# Patient Record
Sex: FEMALE | Race: WHITE | URBAN - METROPOLITAN AREA
[De-identification: names, ages, dates, MRNs, and addresses within clinical notes are randomized per-mention and may not be internally consistent; named-entity substitution may affect disease eponyms.]

---

## 2017-01-03 ENCOUNTER — OFFICE VISIT (OUTPATIENT)
Dept: NEUROLOGY | Age: 22
End: 2017-01-03

## 2017-01-03 VITALS
DIASTOLIC BLOOD PRESSURE: 76 MMHG | WEIGHT: 130 LBS | HEART RATE: 95 BPM | OXYGEN SATURATION: 98 % | HEIGHT: 63 IN | RESPIRATION RATE: 18 BRPM | SYSTOLIC BLOOD PRESSURE: 122 MMHG | BODY MASS INDEX: 23.04 KG/M2

## 2017-01-03 DIAGNOSIS — G43.009 MIGRAINE WITHOUT AURA, NOT INTRACTABLE, WITHOUT STATUS MIGRAINOSUS: Primary | ICD-10-CM

## 2017-01-03 NOTE — PROGRESS NOTES
Patient here for follow up on headaches. She has had 2 migraines since her last office visit in October 2016.

## 2017-01-03 NOTE — PATIENT INSTRUCTIONS
10 [facility]   Statement to Patients  April 1, 2014      In an effort to ensure the large volume of patient prescription refills is processed in the most efficient and expeditious manner, we are asking our patients to assist us by calling your Pharmacy for all prescription refills, this will include also your  Mail Order Pharmacy. The pharmacy will contact our office electronically to continue the refill process. Please do not wait until the last minute to call your pharmacy. We need at least 48 hours (2days) to fill prescriptions. We also encourage you to call your pharmacy before going to  your prescription to make sure it is ready. With regard to controlled substance prescription refill requests (narcotic refills) that need to be picked up at our office, we ask your cooperation by providing us with at least 72 hours (3days) notice that you will need a refill. We will not refill narcotic prescription refill requests after 4:00pm on any weekday, Monday through Thursday, or after 2:00pm on Fridays, or on the weekends. We encourage everyone to explore another way of getting your prescription refill request processed using Clipboard, our patient web portal through our electronic medical record system. Clipboard is an efficient and effective way to communicate your medication request directly to the office and  downloadable as an rakesh on your smart phone . Clipboard also features a review functionality that allows you to view your medication list as well as leave messages for your physician. Are you ready to get connected? If so please review the attatched instructions or speak to any of our staff to get you set up right away! Thank you so much for your cooperation. Should you have any questions please contact our Practice Administrator.     The Physicians and Staff,  [facility]         Thank you for choosing [facility] and [facility] for your     care. You may receive a survey about your visit. We appreciate you taking time     to complete this survey as we use your feedback to improve our services. We     realize we are not perfect, but we strive to provide excellent care.

## 2017-01-03 NOTE — PROGRESS NOTES
Neurology Progress Note    HISTORY PROVIDED BY: patient    Chief Complaint:   Chief Complaint   Patient presents with    Headache      Subjective:   Pt  is a 24 y.o. right handed female last seen in clinic on 10/7/16 in f/u for headaches with onset in 2013, often waking her, +N/V/P/P, c/w MHA. Reported daily HAs over the summer 2016, taking daily abortive meds and non-compliant with prevention medication because she did not want to take medication daily. Exam was non-focal, at last visit noted to have a large cafe-au-lait spot over left neck, 3/6 KAYLYNN. Discussed diagnosis of MHA, treatment with prevention meds, and limiting abortive meds to only 1 or 2 times per week or she should be on a HA prevention medication. She wanted wait to see if her HAs returned, if so, then start amitriptyline 10mg qhs. Given refill for Maxalt for 6 months. She returns for f/u. She called the clinic at reported starting the amitriptyline on 10/19/16. Her HAs are better, she believes they are 3 times a month, but did not really get up with them. She believes she is using Maxalt only 1, maybe 2 per week. She is satisfied with her current MHA control. They are much better than they were. Notes that the nurse reported a fast heart rate today. Pt is asymptomatic, does report h/o heart pounding in past causing chest pain. She denies taking Maxalt today or any other OTC meds today.        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    Pr abdomen surgery proc unlisted       surgery for kidney reflux      Social History     Social History    Marital status: SINGLE     Spouse name: N/A    Number of children: N/A    Years of education: N/A     Occupational History    Student      Social History Main Topics    Smoking status: Never Smoker    Smokeless tobacco: Never Used    Alcohol use No    Drug use: No    Sexual activity: Not on file     Other Topics Concern    Not on file     Social History Narrative    Lives with her mother     Family History   Problem Relation Age of Onset    High Cholesterol Mother     No Known Problems Father     High Cholesterol Brother      familial, kidney reflux surgery    Heart Attack Maternal Grandmother      age 33yo          Objective:   ROS:  Per HPI-  Otherwise 10 point ROS was negative    No Known Allergies    Meds:  Outpatient Medications Prior to Visit   Medication Sig Dispense Refill    rizatriptan (MAXALT) 10 mg tablet Take 1 Tab by mouth daily as needed for Migraine (at onset of migraine). 18 Tab 1    amitriptyline (ELAVIL) 10 mg tablet Take 2 Tabs by mouth nightly. 180 Tab 1    GILDESS FE 1/20, 28, 1 mg-20 mcg (21)/75 mg (7) tab Take one tablet by mouth daily       No facility-administered medications prior to visit. Imaging:  MRI Results (most recent):  No results found for this or any previous visit. CT Results (most recent):  No results found for this or any previous visit. Reviewed records in Dr. Tariff and [employer] tab today    Lab Review   Results for orders placed or performed in visit on 04/21/10   CULTURE, URINE   Result Value Ref Range    Specimen Description: URINE     Special Requests: NO SPECIAL REQUESTS     [place] Count <10,000 COLONIES/mL     Culture result: NO SIGNIFICANT GROWTH     Report Status 04/23/2010 FINAL         Exam:  Visit Vitals    /76    Pulse 95    Resp 18    Ht 5' 3\" (1.6 m)    Wt 59 kg (130 lb)    SpO2 98%    BMI 23.03 kg/m2     General:  Alert, cooperative, no distress. Head:  Normocephalic, without obvious abnormality, atraumatic, large cafe-au-lait spot over left neck,   Respiratory:  Heart:   Non labored breathing  Regular rhythm, tachycardic, 3/6 KAYLYNN       Extremities: Warm, no cyanosis or edema. Pulses: 2+ radial pulses. Neurologic:  MS: Alert and oriented x 4, speech intact. Language intact. Attention and fund of knowledge appropriate. Recent and remote memory intact. Cranial Nerves:  II: visual fields VFF    II: pupils PERRL   II: optic disc Sharp disc margin on right   III,VII: ptosis none   III,IV,VI: extraocular muscles  EOMI, no nystagmus or diplopia   V: facial light touch sensation     VII: facial muscle function   symmetric   VIII: hearing intact   IX: soft palate elevation     XI: trapezius strength     XI: sternocleidomastoid strength    XII: tongue       Motor: 5/5 throughout  Gait: Normal gait           Assessment/Plan   Pt  is a 24 y.o. right handed female with onset in 2013 of headaches, often waking her, +N/V/P/P, c/w MHA. Reported daily HAs over the summer 2016, taking daily abortive meds and non-compliant with prevention medication because she did not want to take medication daily, now resolved. MHAs improved on amitriptyline 10 mg nightly. Exam is non-focal, 3/6 KAYLYNN, tachycardia. Patient is happy with her current level of migraine headache control balanced with her reluctance to take medications. Will continue amitriptyline 10 mg nightly. Amitriptyline at high doses can cause a cardiac arrhythmia, not known for causing tachycardia. I recommend she discuss her tachycardia at her previously scheduled appointment with her PCP today. Patient is asked to keep a headache calendar and bring to her follow-up appointment. Follow-up in clinic in 4 months, instructed to call in the interim if needed. ICD-10-CM ICD-9-CM    1. Migraine without aura, not intractable, without status migrainosus G43.009 346.10        Signed:   Rohit Rodriguez MD  1/3/2017

## 2017-01-03 NOTE — MR AVS SNAPSHOT
Visit Information Date & Time Provider Department Dept. Phone Encounter #  
 1/3/2017 11:40 AM Verna Donning, MD [facility] at [address] [id number] Follow-up Instructions Return in about 4 months (around 5/3/2017). Routing History Upcoming Health Maintenance Date Due  
 HPV AGE 9Y-34Y (1 of 3 - Female 3 Dose Series) 2/9/2006 DTaP/Tdap/Td series (1 - Tdap) 2/9/2016 PAP AKA CERVICAL CYTOLOGY 2/9/2016 INFLUENZA AGE 9 TO ADULT 8/1/2016 Allergies as of 1/3/2017  Review Complete On: 1/3/2017 By: Zena Carson LPN No Known Allergies Current Immunizations  Never Reviewed No immunizations on file. Not reviewed this visit Vitals BP Pulse Resp Height(growth percentile) Weight(growth percentile) SpO2  
 122/76 95 18 5' 3\" (1.6 m) 130 lb (59 kg) 98% BMI Smoking Status 23.03 kg/m2 Never Smoker Vitals History BMI and BSA Data Body Mass Index Body Surface Area 23.03 kg/m 2 1.62 m 2 Preferred Pharmacy Pharmacy Name Phone [id number] [phone] Your Updated Medication List  
  
   
This list is accurate as of: 1/3/17 12:04 PM.  Always use your most recent med list.  
  
  
  
  
 amitriptyline 10 mg tablet Commonly known as:  ELAVIL Take 2 Tabs by mouth nightly. GILDESS FE 1/20 (28) 1 mg-20 mcg (21)/75 mg (7) Tab Generic drug:  norethindrone-ethinyl estradiol Take one tablet by mouth daily  
  
 rizatriptan 10 mg tablet Commonly known as:  Rachael Puls Take 1 Tab by mouth daily as needed for Migraine (at onset of migraine). Follow-up Instructions Return in about 4 months (around 5/3/2017). Patient Instructions PRESCRIPTION REFILL POLICY [facility] Statement to Patients April 1, 2014 In an effort to ensure the large volume of patient prescription refills is processed in the most efficient and expeditious manner, we are asking our patients to assist us by calling your Pharmacy for all prescription refills, this will include also your  Mail Order Pharmacy. The pharmacy will contact our office electronically to continue the refill process. Please do not wait until the last minute to call your pharmacy. We need at least 48 hours (2days) to fill prescriptions. We also encourage you to call your pharmacy before going to  your prescription to make sure it is ready. With regard to controlled substance prescription refill requests (narcotic refills) that need to be picked up at our office, we ask your cooperation by providing us with at least 72 hours (3days) notice that you will need a refill. We will not refill narcotic prescription refill requests after 4:00pm on any weekday, Monday through Thursday, or after 2:00pm on Fridays, or on the weekends. We encourage everyone to explore another way of getting your prescription refill request processed using Black Lotus, our patient web portal through our electronic medical record system. Black Lotus is an efficient and effective way to communicate your medication request directly to the office and  downloadable as an rakesh on your smart phone . Black Lotus also features a review functionality that allows you to view your medication list as well as leave messages for your physician. Are you ready to get connected? If so please review the attatched instructions or speak to any of our staff to get you set up right away! Thank you so much for your cooperation. Should you have any questions please contact our Practice Administrator. The Physicians and Staff,  [facility] Thank you for choosing [facility] for your  
 
care. You may receive a survey about your visit.   We appreciate you taking time  
 
to complete this survey as we use your feedback to improve our services. We  
 
realize we are not perfect, but we strive to provide excellent care. Introducing [facility]! Omari Veras introduces [employer] patient portal. Now you can access parts of your medical record, email your doctor's office, and request medication refills online. 1. In your internet browser, go to [email or web address] 2. Click on the First Time User? Click Here link in the Sign In box. You will see the New Member Sign Up page. 3. Enter your [employer] Access Code exactly as it appears below. You will not need to use this code after youve completed the sign-up process. If you do not sign up before the expiration date, you must request a new code. · [employer] Access Code: [email or web address] Expires: 1/5/2017  9:35 AM 
 
4. Enter the last four digits of your Social Security Number (xxxx) and Date of Birth (mm/dd/yyyy) as indicated and click Submit. You will be taken to the next sign-up page. 5. Create a [employer] ID. This will be your [employer] login ID and cannot be changed, so think of one that is secure and easy to remember. 6. Create a [employer] password. You can change your password at any time. 7. Enter your Password Reset Question and Answer. This can be used at a later time if you forget your password. 8. Enter your e-mail address. You will receive e-mail notification when new information is available in [address] 9. Click Sign Up. You can now view and download portions of your medical record. 10. Click the Download Summary menu link to download a portable copy of your medical information. If you have questions, please visit the Frequently Asked Questions section of the [employer] website. Remember, [employer] is NOT to be used for urgent needs. For medical emergencies, dial 911. Now available from your iPhone and Android! Please provide this summary of care documentation to your next provider. Your primary care clinician is listed as Julian Garzon. If you have any questions after today's visit, please call [phone].

## 2017-04-25 RX ORDER — RIZATRIPTAN BENZOATE 10 MG/1
TABLET ORAL
Qty: 18 TAB | Refills: 0 | Status: SHIPPED | OUTPATIENT
Start: 2017-04-25 | End: 2017-05-31 | Stop reason: SDUPTHER

## 2017-06-01 RX ORDER — RIZATRIPTAN BENZOATE 10 MG/1
TABLET ORAL
Qty: 18 TAB | Refills: 1 | Status: SHIPPED | OUTPATIENT
Start: 2017-06-01 | End: 2017-08-29 | Stop reason: SDUPTHER

## 2017-08-30 RX ORDER — RIZATRIPTAN BENZOATE 10 MG/1
TABLET ORAL
Qty: 18 TAB | Refills: 1 | Status: SHIPPED | OUTPATIENT
Start: 2017-08-30 | End: 2017-11-14 | Stop reason: SDUPTHER

## 2017-11-14 RX ORDER — RIZATRIPTAN BENZOATE 10 MG/1
TABLET ORAL
Qty: 18 TAB | Refills: 1 | Status: SHIPPED | OUTPATIENT
Start: 2017-11-14 | End: 2018-02-05 | Stop reason: SDUPTHER

## 2017-11-14 RX ORDER — AMITRIPTYLINE HYDROCHLORIDE 10 MG/1
TABLET, FILM COATED ORAL
Qty: 180 TAB | Refills: 1 | Status: SHIPPED | OUTPATIENT
Start: 2017-11-14

## 2017-11-28 ENCOUNTER — OFFICE VISIT (OUTPATIENT)
Dept: NEUROLOGY | Age: 22
End: 2017-11-28

## 2017-11-28 VITALS
HEART RATE: 90 BPM | OXYGEN SATURATION: 98 % | HEIGHT: 63 IN | BODY MASS INDEX: 23.04 KG/M2 | DIASTOLIC BLOOD PRESSURE: 62 MMHG | WEIGHT: 130 LBS | SYSTOLIC BLOOD PRESSURE: 124 MMHG | RESPIRATION RATE: 18 BRPM

## 2017-11-28 DIAGNOSIS — G43.009 MIGRAINE WITHOUT AURA, NOT INTRACTABLE, WITHOUT STATUS MIGRAINOSUS: Primary | ICD-10-CM

## 2017-11-28 RX ORDER — NORETHINDRONE ACETATE AND ETHINYL ESTRADIOL AND FERROUS FUMARATE 1MG-20(24)
KIT ORAL
COMMUNITY
Start: 2017-11-12

## 2017-11-28 NOTE — PATIENT INSTRUCTIONS
10 [facility]   Statement to Patients  April 1, 2014      In an effort to ensure the large volume of patient prescription refills is processed in the most efficient and expeditious manner, we are asking our patients to assist us by calling your Pharmacy for all prescription refills, this will include also your  Mail Order Pharmacy. The pharmacy will contact our office electronically to continue the refill process. Please do not wait until the last minute to call your pharmacy. We need at least 48 hours (2days) to fill prescriptions. We also encourage you to call your pharmacy before going to  your prescription to make sure it is ready. With regard to controlled substance prescription refill requests (narcotic refills) that need to be picked up at our office, we ask your cooperation by providing us with at least 72 hours (3days) notice that you will need a refill. We will not refill narcotic prescription refill requests after 4:00pm on any weekday, Monday through Thursday, or after 2:00pm on Fridays, or on the weekends. We encourage everyone to explore another way of getting your prescription refill request processed using InRiver, our patient web portal through our electronic medical record system. InRiver is an efficient and effective way to communicate your medication request directly to the office and  downloadable as an rakesh on your smart phone . InRiver also features a review functionality that allows you to view your medication list as well as leave messages for your physician. Are you ready to get connected? If so please review the attatched instructions or speak to any of our staff to get you set up right away! Thank you so much for your cooperation. Should you have any questions please contact our Practice Administrator.     The Physicians and Staff,  [facility]           Please bring all medication bottles, including vitamins, supplements and any over-the-counter medications, to your next office visit.

## 2017-11-28 NOTE — MR AVS SNAPSHOT
Visit Information Date & Time Provider Department Dept. Phone Encounter #  
 11/28/2017  3:40 PM Garrison Bernal MD [facility] at [address] [id number] Follow-up Instructions Return in about 6 months (around 5/28/2018). Upcoming Health Maintenance Date Due  
 HPV AGE 9Y-34Y (1 of 3 - Female 3 Dose Series) 2/9/2006 DTaP/Tdap/Td series (1 - Tdap) 2/9/2016 PAP AKA CERVICAL CYTOLOGY 2/9/2016 Influenza Age 5 to Adult 8/1/2017 Allergies as of 11/28/2017  Review Complete On: 11/28/2017 By: Sharlene Sy LPN Severity Noted Reaction Type Reactions Soy  11/28/2017    Unknown (comments) Current Immunizations  Never Reviewed No immunizations on file. Not reviewed this visit Vitals BP Pulse Resp Height(growth percentile) Weight(growth percentile) SpO2  
 124/62 90 18 5' 3\" (1.6 m) 130 lb (59 kg) 98% BMI Smoking Status 23.03 kg/m2 Never Smoker Vitals History BMI and BSA Data Body Mass Index Body Surface Area 23.03 kg/m 2 1.62 m 2 Preferred Pharmacy Pharmacy Name Phone [address] [facility] [phone] Your Updated Medication List  
  
   
This list is accurate as of: 11/28/17  4:19 PM.  Always use your most recent med list.  
  
  
  
  
 amitriptyline 10 mg tablet Commonly known as:  ELAVIL TAKE 2 TABLETS NIGHTLY  
  
 * GILDESS FE 1/20 (28) 1 mg-20 mcg (21)/75 mg (7) Tab Generic drug:  norethindrone-ethinyl estradiol Take one tablet by mouth daily * JUNEL FE 24 1 mg-20 mcg (24)/75 mg (4) Tab Generic drug:  norethindrone-e estradiol-iron  
  
 rizatriptan 10 mg tablet Commonly known as:  Juan Hipps TAKE 1 TABLET DAILY AS NEEDED FOR MIGRAINE (AT ONSET OF MIGRAINE) * Notice:   This list has 2 medication(s) that are the same as other medications prescribed for you. Read the directions carefully, and ask your doctor or other care provider to review them with you. Follow-up Instructions Return in about 6 months (around 5/28/2018). Patient Instructions PRESCRIPTION REFILL POLICY [facility] Statement to Patients April 1, 2014 In an effort to ensure the large volume of patient prescription refills is processed in the most efficient and expeditious manner, we are asking our patients to assist us by calling your Pharmacy for all prescription refills, this will include also your  Mail Order Pharmacy. The pharmacy will contact our office electronically to continue the refill process. Please do not wait until the last minute to call your pharmacy. We need at least 48 hours (2days) to fill prescriptions. We also encourage you to call your pharmacy before going to  your prescription to make sure it is ready. With regard to controlled substance prescription refill requests (narcotic refills) that need to be picked up at our office, we ask your cooperation by providing us with at least 72 hours (3days) notice that you will need a refill. We will not refill narcotic prescription refill requests after 4:00pm on any weekday, Monday through Thursday, or after 2:00pm on Fridays, or on the weekends. We encourage everyone to explore another way of getting your prescription refill request processed using [employer], our patient web portal through our electronic medical record system. [employer] is an efficient and effective way to communicate your medication request directly to the office and  downloadable as an rakesh on your smart phone . [employer] also features a review functionality that allows you to view your medication list as well as leave messages for your physician. Are you ready to get connected?  If so please review the attatched instructions or speak to any of our staff to get you set up right away! Thank you so much for your cooperation. Should you have any questions please contact our Practice Administrator. The Physicians and Staff,  [facility] Please bring all medication bottles, including vitamins, supplements and any over-the-counter medications, to your next office visit. Introducing [facility]! [facility] introduces DialMyApp patient portal. Now you can access parts of your medical record, email your doctor's office, and request medication refills online. 1. In your internet browser, go to [email or web address] 2. Click on the First Time User? Click Here link in the Sign In box. You will see the New Member Sign Up page. 3. Enter your DialMyApp Access Code exactly as it appears below. You will not need to use this code after youve completed the sign-up process. If you do not sign up before the expiration date, you must request a new code. · DialMyApp Access Code: [email or web address] Expires: 2/26/2018  3:32 PM 
 
4. Enter the last four digits of your Social Security Number (xxxx) and Date of Birth (mm/dd/yyyy) as indicated and click Submit. You will be taken to the next sign-up page. 5. Create a DialMyApp ID. This will be your DialMyApp login ID and cannot be changed, so think of one that is secure and easy to remember. 6. Create a DialMyApp password. You can change your password at any time. 7. Enter your Password Reset Question and Answer. This can be used at a later time if you forget your password. 8. Enter your e-mail address. You will receive e-mail notification when new information is available in [address] 9. Click Sign Up. You can now view and download portions of your medical record. 10. Click the Download Summary menu link to download a portable copy of your medical information.  
 
If you have questions, please visit the Frequently Asked Questions section of the [employer]. Remember, [employer] is NOT to be used for urgent needs. For medical emergencies, dial 911. Now available from your iPhone and Android! Please provide this summary of care documentation to your next provider. Your primary care clinician is listed as Jessica Mark. If you have any questions after today's visit, please call [phone].

## 2017-11-28 NOTE — PROGRESS NOTES
Patient here for follow up on headaches. Headaches are the same since last visit. They occur 1-2 times per week.

## 2017-11-28 NOTE — PROGRESS NOTES
Neurology Progress Note    HISTORY PROVIDED BY: patient    Chief Complaint:   Chief Complaint   Patient presents with    Headache     f/u      Subjective:   Pt  is a 25 y.o. right handed female last seen in [address] [place] [address] on 1/3/17 in f/u for onset in 2013 of headaches, often waking her, +N/V/P/P, c/w MHA. Reported daily HAs over the summer 2016, taking daily abortive meds and non-compliant with prevention medication because she did not want to take medication daily, now resolved. MHAs improved on amitriptyline 20 mg nightly. Exam was non-focal, 3/6 KAYLYNN, tachycardia. Patient was happy with her current level of migraine headache control balanced with her reluctance to take medications. Continued amitriptyline 20 mg nightly. Patient was asked to keep a headache calendar and bring to her follow-up appointment. Follow-up in clinic in 4 months. She returns for delayed f/u. 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 She feels like she is not having to use the rizatriptan as much now that she is on the amitriptyline. Has HA 1-2 times per week and is able to take abortive medication with improvement. Since last visit she was having palpatations and was found to have a BBB, she saw a cardiologist who was not concerned and she did not need f/u. She did cut out caffeine. No new complaints.  She has gotten engaged and is planning to  in Dec, 2018 in [facility].      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      Social History     Social History    Marital status: SINGLE     Spouse name: N/A    Number of children: N/A    Years of education: N/A     Occupational History    Student      Social History Main Topics    Smoking status: Never Smoker    Smokeless tobacco: Never Used    Alcohol use No    Drug use: No    Sexual activity: Not on file     Other Topics Concern    Not on file     Social History Narrative    Lives with her mother     Family History   Problem Relation Age of Onset    High Cholesterol Mother     No Known Problems Father     High Cholesterol Brother      familial, kidney reflux surgery    Heart Attack Maternal Grandmother      age 33yo          Objective:   ROS:  Per HPI-  Otherwise 10 point ROS was negative    Allergies   Allergen Reactions    Soy Unknown (comments)       Meds:  Outpatient Medications Prior to Visit   Medication Sig Dispense Refill    rizatriptan (MAXALT) 10 mg tablet TAKE 1 TABLET DAILY AS NEEDED FOR MIGRAINE (AT ONSET OF MIGRAINE) 18 Tab 1    amitriptyline (ELAVIL) 10 mg tablet TAKE 2 TABLETS NIGHTLY 180 Tab 1    GILDESS FE 1/20, 28, 1 mg-20 mcg (21)/75 mg (7) tab Take one tablet by mouth daily       No facility-administered medications prior to visit. Imaging:  MRI Results (most recent):  No results found for this or any previous visit. CT Results (most recent):  No results found for this or any previous visit. Reviewed records in [employer] and [employer] tab today    Lab Review   Results for orders placed or performed in visit on 04/21/10   CULTURE, URINE   Result Value Ref Range    Specimen Description: URINE     Special Requests: NO SPECIAL REQUESTS     [place] Count <10,000 COLONIES/mL     Culture result: NO SIGNIFICANT GROWTH     Report Status 04/23/2010 FINAL         Exam:  Visit Vitals    /62    Pulse 90    Resp 18    Ht 5' 3\" (1.6 m)    Wt 59 kg (130 lb)    SpO2 98%    BMI 23.03 kg/m2     General:  Alert, cooperative, no distress.    Head:  Normocephalic, without obvious abnormality, atraumatic, large cafe-au-lait spot over left neck,   Respiratory:  Heart:   Non labored breathing  Regular rhythm, tachycardic, 3/6 KAYLYNN       Extremities: Warm, no cyanosis or edema. Pulses: 2+ radial pulses. Neurologic:  MS: Alert and oriented x 4, speech intact. Language intact. Attention and fund of knowledge appropriate. Recent and remote memory intact. Cranial Nerves:  II: visual fields VFF    II: pupils PERRL   II: optic disc    III,VII: ptosis none   III,IV,VI: extraocular muscles  EOMI, no nystagmus or diplopia   V: facial light touch sensation     VII: facial muscle function   symmetric   VIII: hearing intact   IX: soft palate elevation  Palate elevated sym   XI: trapezius strength     XI: sternocleidomastoid strength    XII: tongue  Tongue midline     Motor: 5/5 throughout, no PD, no tremors  Coordination: FTN intact bilaterally  Gait: Normal gait           Assessment/Plan   Pt  is a 25 y.o. right handed female with onset in 2013 of headaches, often waking her, +N/V/P/P, c/w MHA. MHAs improved on amitriptyline 20 mg nightly, still 1-2 HAs per week, but responsive to abortive meds. Exam is non-focal, 3/6 KAYLYNN, tachycardia. Continue amitriptyline 20 mg nightly. Continue Maxalt for abortive therapy. F/u in clinic in 6 months, instructed to call in the interim if needed. ICD-10-CM ICD-9-CM    1. Migraine without aura, not intractable, without status migrainosus G43.009 346.10        Signed:   Matt Mendiola MD  11/28/2017

## 2018-04-03 RX ORDER — RIZATRIPTAN BENZOATE 10 MG/1
TABLET ORAL
Qty: 18 TAB | Refills: 0 | Status: SHIPPED | OUTPATIENT
Start: 2018-04-03 | End: 2018-05-17 | Stop reason: SDUPTHER

## 2018-05-29 RX ORDER — RIZATRIPTAN BENZOATE 10 MG/1
TABLET ORAL
Qty: 18 TAB | Refills: 0 | Status: SHIPPED | OUTPATIENT
Start: 2018-05-29 | End: 2018-07-09 | Stop reason: SDUPTHER

## 2018-07-09 ENCOUNTER — OFFICE VISIT (OUTPATIENT)
Dept: NEUROLOGY | Age: 23
End: 2018-07-09

## 2018-07-09 VITALS
HEIGHT: 63 IN | RESPIRATION RATE: 18 BRPM | SYSTOLIC BLOOD PRESSURE: 122 MMHG | HEART RATE: 90 BPM | OXYGEN SATURATION: 97 % | WEIGHT: 140 LBS | DIASTOLIC BLOOD PRESSURE: 70 MMHG | BODY MASS INDEX: 24.8 KG/M2

## 2018-07-09 DIAGNOSIS — G43.009 MIGRAINE WITHOUT AURA, NOT INTRACTABLE, WITHOUT STATUS MIGRAINOSUS: Primary | ICD-10-CM

## 2018-07-09 RX ORDER — PROPRANOLOL HYDROCHLORIDE 10 MG/1
10 TABLET ORAL 2 TIMES DAILY
Qty: 60 TAB | Refills: 5 | Status: SHIPPED | OUTPATIENT
Start: 2018-07-09

## 2018-07-09 RX ORDER — RIZATRIPTAN BENZOATE 10 MG/1
TABLET ORAL
Qty: 18 TAB | Refills: 5 | Status: SHIPPED | OUTPATIENT
Start: 2018-07-09 | End: 2018-07-23 | Stop reason: SDUPTHER

## 2018-07-09 RX ORDER — RIZATRIPTAN BENZOATE 10 MG/1
TABLET ORAL
Qty: 18 TAB | Refills: 5 | Status: SHIPPED | OUTPATIENT
Start: 2018-07-09 | End: 2018-07-09 | Stop reason: SDUPTHER

## 2018-07-09 NOTE — PROGRESS NOTES
Date:            2018    Name:  Selvin Kong  :  1995  MRN:  [id number]     PCP:  Kalpana Aleman MD    Chief Complaint   Patient presents with    Migraine         HISTORY OF PRESENT ILLNESS:  Leah Packer is a 21 y.o., female who presents today for follow up for headaches. She was last seen in 2017. She is taking amitriptyline 10 mg, her last rx said to increase but she did not. She thinks that it raises her HR and is reluctant to increase it further. She was seen by cardiologist at some point because EKG was suggestive of right bundle branch block, but reports that the cardiologist told her that the EKG was read wrong and she does not have a bundle branch block. She is not following with that provider. She denies palpitations. She thought that migraines were from eating soy, but she has cut that out and still has migraines. Her maxalt works well for abortive, but she finds herself taking it a few times a week. She gets 18 pills per prescription, usually refills that every month and a half    11.28.2017 recap  Pt  is a 25 y.o. right handed female last seen in [address] [place] [address] on 1/3/17 in f/u for onset in  of headaches, often waking her, +N/V/P/P, c/w MHA. Reported daily HAs over the summer , taking daily abortive meds and non-compliant with prevention medication because she did not want to take medication daily, now resolved. MHAs improved on amitriptyline 20 mg nightly. Exam was non-focal, 3/6 KAYLYNN, tachycardia. Patient was happy with her current level of migraine headache control balanced with her reluctance to take medications. Continued amitriptyline 20 mg nightly. Patient was asked to keep a headache calendar and bring to her follow-up appointment. Follow-up in clinic in 4 months.     She returns for delayed f/u. 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  She feels like she is not having to use the rizatriptan as much now that she is on the amitriptyline. Has HA 1-2 times per week and is able to take abortive medication with improvement. Since last visit she was having palpatations and was found to have a BBB, she saw a cardiologist who was not concerned and she did not need f/u. She did cut out caffeine. No new complaints. She has gotten engaged and is planning to  in Dec, 2018 in [facility].         Current Outpatient Prescriptions   Medication Sig    rizatriptan (MAXALT) 10 mg tablet TAKE 1 TABLET AS NEEDED AT ONSET OF MIGRAINE, MAY REPEAT FOR 1 TIME AFTER 2 HOURS IF NEEDED MAX 2 TABLETS PER 24 HOURS    JUNEL FE 24 1 mg-20 mcg (24)/75 mg (4) tab     amitriptyline (ELAVIL) 10 mg tablet TAKE 2 TABLETS NIGHTLY    GILDESS FE 1/20, 28, 1 mg-20 mcg (21)/75 mg (7) tab Take one tablet by mouth daily     No current facility-administered medications for this visit.       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     Social History     Social History    Marital status: SINGLE     Spouse name: N/A    Number of children: N/A    Years of education: N/A     Occupational History    Student      Social History Main Topics    Smoking status: Never Smoker    Smokeless tobacco: Never Used    Alcohol use No    Drug use: No    Sexual activity: Not on file     Other Topics Concern    Not on file     Social History Narrative    Lives with her mother     Family History   Problem Relation Age of Onset    High Cholesterol Mother     No Known Problems Father     High Cholesterol Brother      familial, kidney reflux surgery    Heart Attack Maternal Grandmother      age 33yo         PHYSICAL EXAMINATION:    Visit Vitals    /70    Pulse 90    Resp 18    Ht 5' 3\" (1.6 m)    Wt 63.5 kg (140 lb)    SpO2 97%    BMI 24.8 kg/m2     General:  Well defined, nourished, and groomed individual in no acute distress. Neck: Supple, nontender, no bruits. Heart: Regular rate and rhythm, no murmurs, rub, or gallop. Normal S1S2. Lungs:  Clear to auscultation bilaterally with equal chest expansion, no cough, no wheeze  Musculoskeletal:  Extremities revealed no edema and had full range of motion of joints. Psych:  Good mood and bright affect    NEUROLOGICAL EXAMINATION:     Mental Status:   Alert and oriented to person, place, and time with recent and remote memory intact. Attention span and concentration are normal. Speech is fluent with a full fund of knowledge. Cranial Nerves:    II, III, IV, VI:  Visual acuity grossly intact. Pupils are equal, round, and reactive to light. Extra-ocular movements are full and fluid. No ptosis or nystagmus. V-XII: Hearing is grossly intact. Facial features are symmetric, with normal sensation and strength. The palate rises symmetrically and the tongue protrudes midline. Sternocleidomastoids 5/5. Motor Examination: Normal tone, bulk, and strength, 5/5 muscle strength throughout. Coordination:  Finger to nose testing was normal.   No resting or intention tremor  Gait and Station:  Steady while walking and with tandem walking. Normal arm swing. No pronator drift. No muscle wasting or fasciculations noted. ASSESSMENT AND PLAN    ICD-10-CM ICD-9-CM    1. Migraine without aura, not intractable, without status migrainosus G43.009 346.10 propranolol (INDERAL) 10 mg tablet      rizatriptan (MAXALT) 10 mg tablet      DISCONTINUED: rizatriptan (MAXALT) 10 mg tablet     77-year-old female seen in follow-up for migraines. She is on amitriptyline for preventative, would prefer to try different one.   She has about 12 migraines per month, Maxalt works well for abortive. She does not want to try Topamax because it might make her birth control ineffective. 1.  Propranolol 10 mg twice daily for migraine preventative, discussed side effects  2. Continue Maxalt 10 mg as needed for migraine abortive  3. Provided with migraine education, discussed lifestyle modifications that she can try for migraine prevention. Encouraged her that magnesium supplement, but discussed that it may take 3 months to be effective    Follow-up in 6 months, call sooner with concerns      Agata Pereira NP    This note was created using voice recognition software. Despite editing, there may be syntax errors.

## 2018-07-09 NOTE — PATIENT INSTRUCTIONS
Propranolol 10 mg tabs: Take 1 tab at bedtime initially to see how you tolerate it, then take 1 tab twice daily    Call the office if you get dizzy or drowsy on this medication, or if your heart rate is lower than [address]   Statement to Patients  April 1, 2014      In an effort to ensure the large volume of patient prescription refills is processed in the most efficient and expeditious manner, we are asking our patients to assist us by calling your Pharmacy for all prescription refills, this will include also your  Mail Order Pharmacy. The pharmacy will contact our office electronically to continue the refill process. Please do not wait until the last minute to call your pharmacy. We need at least 48 hours (2days) to fill prescriptions. We also encourage you to call your pharmacy before going to  your prescription to make sure it is ready. With regard to controlled substance prescription refill requests (narcotic refills) that need to be picked up at our office, we ask your cooperation by providing us with at least 72 hours (3days) notice that you will need a refill. We will not refill narcotic prescription refill requests after 4:00pm on any weekday, Monday through Thursday, or after 2:00pm on Fridays, or on the weekends. We encourage everyone to explore another way of getting your prescription refill request processed using [employer], our patient web portal through our electronic medical record system. [employer] is an efficient and effective way to communicate your medication request directly to the office and  downloadable as an rakesh on your smart phone . [employer] also features a review functionality that allows you to view your medication list as well as leave messages for your physician. Are you ready to get connected? If so please review the attatched instructions or speak to any of our staff to get you set up right away!     Thank you so much for your cooperation. Should you have any questions please contact our Practice Administrator. The Physicians and Staff,  [facility]                A Healthy Lifestyle: Care Instructions  Your Care Instructions    A healthy lifestyle can help you feel good, stay at a healthy weight, and have plenty of energy for both work and play. A healthy lifestyle is something you can share with your whole family. A healthy lifestyle also can lower your risk for serious health problems, such as high blood pressure, heart disease, and diabetes. You can follow a few steps listed below to improve your health and the health of your family. Follow-up care is a key part of your treatment and safety. Be sure to make and go to all appointments, and call your doctor if you are having problems. It's also a good idea to know your test results and keep a list of the medicines you take. How can you care for yourself at home? · Do not eat too much sugar, fat, or fast foods. You can still have dessert and treats now and then. The goal is moderation. · Start small to improve your eating habits. Pay attention to portion sizes, drink less juice and soda pop, and eat more fruits and vegetables. ¨ Eat a healthy amount of food. A 3-ounce serving of meat, for example, is about the size of a deck of cards. Fill the rest of your plate with vegetables and whole grains. ¨ Limit the amount of soda and sports drinks you have every day. Drink more water when you are thirsty. ¨ Eat at least 5 servings of fruits and vegetables every day. It may seem like a lot, but it is not hard to reach this goal. A serving or helping is 1 piece of fruit, 1 cup of vegetables, or 2 cups of leafy, raw vegetables. Have an apple or some carrot sticks as an afternoon snack instead of a candy bar. Try to have fruits and/or vegetables at every meal.  · Make exercise part of your daily routine.  You may want to start with simple activities, such as walking, bicycling, or slow swimming. Try to be active 30 to 60 minutes every day. You do not need to do all 30 to 60 minutes all at once. For example, you can exercise 3 times a day for 10 or 20 minutes. Moderate exercise is safe for most people, but it is always a good idea to talk to your doctor before starting an exercise program.  · Keep moving. Kunaljulio Edgar the lawn, work in the garden, or [employer]. Take the stairs instead of the elevator at work. · If you smoke, quit. People who smoke have an increased risk for heart attack, stroke, cancer, and other lung illnesses. Quitting is hard, but there are ways to boost your chance of quitting tobacco for good. ¨ Use nicotine gum, patches, or lozenges. ¨ Ask your doctor about stop-smoking programs and medicines. ¨ Keep trying. In addition to reducing your risk of diseases in the future, you will notice some benefits soon after you stop using tobacco. If you have shortness of breath or asthma symptoms, they will likely get better within a few weeks after you quit. · Limit how much alcohol you drink. Moderate amounts of alcohol (up to 2 drinks a day for men, 1 drink a day for women) are okay. But drinking too much can lead to liver problems, high blood pressure, and other health problems. Family health  If you have a family, there are many things you can do together to improve your health. · Eat meals together as a family as often as possible. · Eat healthy foods. This includes fruits, vegetables, lean meats and dairy, and whole grains. · Include your family in your fitness plan. Most people think of activities such as jogging or tennis as the way to fitness, but there are many ways you and your family can be more active. Anything that makes you breathe hard and gets your heart pumping is exercise. Here are some tips:  ¨ Walk to do errands or to take your child to school or the bus. ¨ Go for a family bike ride after dinner instead of watching TV.   Where can you learn more? Go to http://dileep-raymond.info/. Enter N876 in the search box to learn more about \"A Healthy Lifestyle: Care Instructions. \"  Current as of: May 12, 2017  Content Version: 11.4  © 7359-4950 Healthwise, [employer]. Care instructions adapted under license by [employer] (which disclaims liability or warranty for this information). If you have questions about a medical condition or this instruction, always ask your healthcare professional. [address] any warranty or liability for your use of this information.

## 2018-07-09 NOTE — MR AVS SNAPSHOT
[facility] [address] 
[phone] Patient: Tameka Monzon MRN: [id number] Visit Information Date & Time Provider Department Dept. Phone Encounter #  
 7/9/2018 11:30 AM Constance Casey NP [facility] at [address] [id number] Upcoming Health Maintenance Date Due  
 HPV Age 9Y-34Y (1 of 1 - Female 3 Dose Series) 2/9/2006 DTaP/Tdap/Td series (1 - Tdap) 2/9/2016 PAP AKA CERVICAL CYTOLOGY 2/9/2016 Influenza Age 5 to Adult 8/1/2018 Allergies as of 7/9/2018  Review Complete On: 7/9/2018 By: Onel Lucas LPN Severity Noted Reaction Type Reactions Soy  11/28/2017    Unknown (comments) Current Immunizations  Never Reviewed No immunizations on file. Not reviewed this visit You Were Diagnosed With   
  
 Codes Comments Migraine without aura, not intractable, without status migrainosus    -  Primary ICD-10-CM: G43.009 ICD-9-CM: 346.10 Vitals BP Pulse Resp Height(growth percentile) Weight(growth percentile) SpO2  
 122/70 90 18 5' 3\" (1.6 m) 140 lb (63.5 kg) 97% BMI Smoking Status 24.8 kg/m2 Never Smoker Vitals History BMI and BSA Data Body Mass Index Body Surface Area  
 24.8 kg/m 2 1.68 m 2 Preferred Pharmacy Pharmacy Name Phone Josue Hudson, [facility] [phone] Your Updated Medication List  
  
   
This list is accurate as of 7/9/18 12:04 PM.  Always use your most recent med list.  
  
  
  
  
 amitriptyline 10 mg tablet Commonly known as:  ELAVIL TAKE 2 TABLETS NIGHTLY  
  
 * GILDESS FE 1/20 (28) 1 mg-20 mcg (21)/75 mg (7) Tab Generic drug:  norethindrone-ethinyl estradiol Take one tablet by mouth daily * JUNEL FE 24 1 mg-20 mcg (24)/75 mg (4) Tab Generic drug:  norethindrone-e estradiol-iron propranolol 10 mg tablet Commonly known as:  INDERAL Take 1 Tab by mouth two (2) times a day. rizatriptan 10 mg tablet Commonly known as:  [place] [employer] TAKE 1 TABLET AS NEEDED AT ONSET OF MIGRAINE, MAY REPEAT FOR 1 TIME AFTER 2 HOURS IF NEEDED MAX 2 TABLETS PER 24 HOURS * Notice: This list has 2 medication(s) that are the same as other medications prescribed for you. Read the directions carefully, and ask your doctor or other care provider to review them with you. Prescriptions Printed Refills  
 propranolol (INDERAL) 10 mg tablet 5 Sig: Take 1 Tab by mouth two (2) times a day. Class: Print Route: Oral  
 rizatriptan (MAXALT) 10 mg tablet 5 Sig: TAKE 1 TABLET AS NEEDED AT ONSET OF MIGRAINE, MAY REPEAT FOR 1 TIME AFTER 2 HOURS IF NEEDED MAX 2 TABLETS PER 24 HOURS Class: Print Patient Instructions Propranolol 10 mg tabs: Take 1 tab at bedtime initially to see how you tolerate it, then take 1 tab twice daily Call the office if you get dizzy or drowsy on this medication, or if your heart rate is lower than 60 PRESCRIPTION REFILL POLICY [facility] Statement to Patients April 1, 2014 In an effort to ensure the large volume of patient prescription refills is processed in the most efficient and expeditious manner, we are asking our patients to assist us by calling your Pharmacy for all prescription refills, this will include also your  Mail Order Pharmacy. The pharmacy will contact our office electronically to continue the refill process. Please do not wait until the last minute to call your pharmacy. We need at least 48 hours (2days) to fill prescriptions. We also encourage you to call your pharmacy before going to  your prescription to make sure it is ready.   
 
With regard to controlled substance prescription refill requests (narcotic refills) that need to be picked up at our office, we ask your cooperation by providing us with at least 72 hours (3days) notice that you will need a refill. We will not refill narcotic prescription refill requests after 4:00pm on any weekday, Monday through Thursday, or after 2:00pm on Fridays, or on the weekends. We encourage everyone to explore another way of getting your prescription refill request processed using e-SENS, our patient web portal through our electronic medical record system. e-SENS is an efficient and effective way to communicate your medication request directly to the office and  downloadable as an rakesh on your smart phone . e-SENS also features a review functionality that allows you to view your medication list as well as leave messages for your physician. Are you ready to get connected? If so please review the attatched instructions or speak to any of our staff to get you set up right away! Thank you so much for your cooperation. Should you have any questions please contact our Practice Administrator. The Physicians and Staff,  [facility] A Healthy Lifestyle: Care Instructions Your Care Instructions A healthy lifestyle can help you feel good, stay at a healthy weight, and have plenty of energy for both work and play. A healthy lifestyle is something you can share with your whole family. A healthy lifestyle also can lower your risk for serious health problems, such as high blood pressure, heart disease, and diabetes. You can follow a few steps listed below to improve your health and the health of your family. Follow-up care is a key part of your treatment and safety. Be sure to make and go to all appointments, and call your doctor if you are having problems. It's also a good idea to know your test results and keep a list of the medicines you take. How can you care for yourself at home? · Do not eat too much sugar, fat, or fast foods. You can still have dessert and treats now and then. The goal is moderation. · Start small to improve your eating habits. Pay attention to portion sizes, drink less juice and soda pop, and eat more fruits and vegetables. ¨ Eat a healthy amount of food. A 3-ounce serving of meat, for example, is about the size of a deck of cards. Fill the rest of your plate with vegetables and whole grains. ¨ Limit the amount of soda and sports drinks you have every day. Drink more water when you are thirsty. ¨ Eat at least 5 servings of fruits and vegetables every day. It may seem like a lot, but it is not hard to reach this goal. A serving or helping is 1 piece of fruit, 1 cup of vegetables, or 2 cups of leafy, raw vegetables. Have an apple or some carrot sticks as an afternoon snack instead of a candy bar. Try to have fruits and/or vegetables at every meal. 
· Make exercise part of your daily routine. You may want to start with simple activities, such as walking, bicycling, or slow swimming. Try to be active 30 to 60 minutes every day. You do not need to do all 30 to 60 minutes all at once. For example, you can exercise 3 times a day for 10 or 20 minutes. Moderate exercise is safe for most people, but it is always a good idea to talk to your doctor before starting an exercise program. 
· Keep moving. Maymelanie Dais the lawn, work in the garden, or [employer]. Take the stairs instead of the elevator at work. · If you smoke, quit. People who smoke have an increased risk for heart attack, stroke, cancer, and other lung illnesses. Quitting is hard, but there are ways to boost your chance of quitting tobacco for good. ¨ Use nicotine gum, patches, or lozenges. ¨ Ask your doctor about stop-smoking programs and medicines. ¨ Keep trying. In addition to reducing your risk of diseases in the future, you will notice some benefits soon after you stop using tobacco. If you have shortness of breath or asthma symptoms, they will likely get better within a few weeks after you quit. · Limit how much alcohol you drink. Moderate amounts of alcohol (up to 2 drinks a day for men, 1 drink a day for women) are okay. But drinking too much can lead to liver problems, high blood pressure, and other health problems. Family health If you have a family, there are many things you can do together to improve your health. · Eat meals together as a family as often as possible. · Eat healthy foods. This includes fruits, vegetables, lean meats and dairy, and whole grains. · Include your family in your fitness plan. Most people think of activities such as jogging or tennis as the way to fitness, but there are many ways you and your family can be more active. Anything that makes you breathe hard and gets your heart pumping is exercise. Here are some tips: 
¨ Walk to do errands or to take your child to school or the bus. ¨ Go for a family bike ride after dinner instead of watching TV. Where can you learn more? Go to http://dileep-raymond.info/. Enter V656 in the search box to learn more about \"A Healthy Lifestyle: Care Instructions. \" Current as of: May 12, 2017 Content Version: 11.4 © 4015-7192 [employer] Care instructions adapted under license by [employer] (which disclaims liability or warranty for this information). If you have questions about a medical condition or this instruction, always ask your healthcare professional. [place] [address] any warranty or liability for your use of this information. Introducing [facility]! [employer] introduces [employer] patient portal. Now you can access parts of your medical record, email your doctor's office, and request medication refills online. 1. In your internet browser, go to [email or web address] 2. Click on the First Time User? Click Here link in the Sign In box. You will see the New Member Sign Up page. 3. Enter your [employer] Access Code exactly as it appears below. You will not need to use this code after youve completed the sign-up process. If you do not sign up before the expiration date, you must request a new code. · [employer] Access Code: [email or web address] Expires: 10/7/2018 11:28 AM 
 
4. Enter the last four digits of your Social Security Number (xxxx) and Date of Birth (mm/dd/yyyy) as indicated and click Submit. You will be taken to the next sign-up page. 5. Create a [employer] ID. This will be your [employer] login ID and cannot be changed, so think of one that is secure and easy to remember. 6. Create a [employer] password. You can change your password at any time. 7. Enter your Password Reset Question and Answer. This can be used at a later time if you forget your password. 8. Enter your e-mail address. You will receive e-mail notification when new information is available in [address]. 9. Click Sign Up. You can now view and download portions of your medical record. 10. Click the Download Summary menu link to download a portable copy of your medical information. If you have questions, please visit the Frequently Asked Questions section of the [employer] website. Remember, [employer] is NOT to be used for urgent needs. For medical emergencies, dial 911. Now available from your iPhone and Android! Please provide this summary of care documentation to your next provider. Your primary care clinician is listed as Evita Tucker. If you have any questions after today's visit, please call [phone].

## 2018-07-09 NOTE — PROGRESS NOTES
Patient here for follow up on migraines. Patient reported she does not feel her migraines have improved since last office visit.

## 2018-07-23 DIAGNOSIS — G43.009 MIGRAINE WITHOUT AURA, NOT INTRACTABLE, WITHOUT STATUS MIGRAINOSUS: ICD-10-CM

## 2018-07-23 RX ORDER — RIZATRIPTAN BENZOATE 10 MG/1
TABLET ORAL
Qty: 9 TAB | Refills: 0 | OUTPATIENT
Start: 2018-07-23

## 2018-07-23 NOTE — TELEPHONE ENCOUNTER
Requested Prescriptions     Pending Prescriptions Disp Refills    rizatriptan (MAXALT) 10 mg tablet 18 Tab 5     Sig: TAKE 1 TABLET AS NEEDED AT ONSET OF MIGRAINE, MAY REPEAT FOR 1 TIME AFTER 2 HOURS IF NEEDED MAX 2 TABLETS PER 24 HOURS     Pt states script was sent to [facility] however it will take time to get to her. Can the MD write something that she can  at the pharmacy. ... [facility], [address], [place], [place]. Pt is out of medication now. Please call patient call back.

## 2018-07-24 NOTE — TELEPHONE ENCOUNTER
Patient's previous script was sent to mail order pharmacy. Called in one month supply to local pharmacy.

## 2018-07-26 RX ORDER — RIZATRIPTAN BENZOATE 10 MG/1
TABLET ORAL
Qty: 9 TAB | Refills: 0 | OUTPATIENT
Start: 2018-07-26 | End: 2018-08-28 | Stop reason: SDUPTHER

## 2018-11-28 DIAGNOSIS — G43.009 MIGRAINE WITHOUT AURA, NOT INTRACTABLE, WITHOUT STATUS MIGRAINOSUS: ICD-10-CM

## 2018-11-29 RX ORDER — RIZATRIPTAN BENZOATE 10 MG/1
TABLET ORAL
Qty: 18 TAB | Refills: 1 | Status: SHIPPED | OUTPATIENT
Start: 2018-11-29 | End: 2019-02-19 | Stop reason: SDUPTHER

## 2019-02-18 ENCOUNTER — TELEPHONE (OUTPATIENT)
Dept: NEUROLOGY | Age: 24
End: 2019-02-18

## 2019-02-18 DIAGNOSIS — G43.009 MIGRAINE WITHOUT AURA, NOT INTRACTABLE, WITHOUT STATUS MIGRAINOSUS: Primary | ICD-10-CM

## 2019-02-18 NOTE — TELEPHONE ENCOUNTER
Patient requesting referral to begin seeing Neurologist at [facility]. Please advise.     Best # [phone]

## 2019-02-18 NOTE — TELEPHONE ENCOUNTER
Patient returning Lo's call.     [facility] is in [facility] --> [address], [place], [address]    Best # [phone]

## 2019-02-19 DIAGNOSIS — G43.009 MIGRAINE WITHOUT AURA, NOT INTRACTABLE, WITHOUT STATUS MIGRAINOSUS: ICD-10-CM

## 2019-02-19 RX ORDER — RIZATRIPTAN BENZOATE 10 MG/1
10 TABLET ORAL
Qty: 18 TAB | Refills: 0 | Status: SHIPPED | OUTPATIENT
Start: 2019-02-19 | End: 2019-02-19

## 2019-02-19 NOTE — TELEPHONE ENCOUNTER
Requested Prescriptions     Pending Prescriptions Disp Refills    rizatriptan (MAXALT) 10 mg tablet 18 Tab 1     Sig: May repeat in 2 hours if needed

## 2019-02-22 ENCOUNTER — DOCUMENTATION ONLY (OUTPATIENT)
Dept: NEUROLOGY | Age: 24
End: 2019-02-22

## 2019-03-15 ENCOUNTER — DOCUMENTATION ONLY (OUTPATIENT)
Dept: NEUROLOGY | Age: 24
End: 2019-03-15

## 2019-03-15 ENCOUNTER — TELEPHONE (OUTPATIENT)
Dept: NEUROLOGY | Age: 24
End: 2019-03-15

## 2019-03-15 RX ORDER — AMITRIPTYLINE HYDROCHLORIDE 10 MG/1
TABLET, FILM COATED ORAL
Qty: 180 TAB | Refills: 1 | Status: CANCELLED | OUTPATIENT
Start: 2019-03-15

## 2019-03-15 NOTE — TELEPHONE ENCOUNTER
----- Message from Kylah Delgado sent at 3/15/2019 12:29 PM EDT -----  Regarding: Dr. Junior Olsen  The patient missed a call and is requesting a call back.  [phone])[phone]

## 2019-03-15 NOTE — TELEPHONE ENCOUNTER
Requested Prescriptions     Pending Prescriptions Disp Refills    amitriptyline (ELAVIL) 10 mg tablet 180 Tab 1     Pt tried to make an appt but Dr. Geovanna Decker doesn't have anything until may.  Please call back

## 2019-03-15 NOTE — TELEPHONE ENCOUNTER
Patient has moved out of town and plans to see a Neurologist in [facility]. In July.

## 2019-03-15 NOTE — TELEPHONE ENCOUNTER
Pt stated she needs a refill on her Rizatriptan.  She has an upcoming appt scheduled for 5/31, that is the soonest. Please call

## 2019-03-15 NOTE — TELEPHONE ENCOUNTER
Informed patient.  She is ok with this and plans to pick it up at [facility].

## 2019-03-15 NOTE — TELEPHONE ENCOUNTER
Pablo - Please call pt: I cannot refill the rizatriptan. I sent in a 3 month supply, 18 tabs, on 2/19/19 to the [facility] in [facility].

## 2019-03-15 NOTE — PROGRESS NOTES
I left message for patient stating she should consult her new neurologist about Amitriptyline since she hasn't been on it for a while and is transferring her care.

## 2019-03-15 NOTE — TELEPHONE ENCOUNTER
Pablo Imelda ESTEVES have not refilled amitriptyline since 11/2017, so either someone else has been providing this or she has not been taking it. Also, she requested a referral to a neurologist in [address] [place] [address] in Feb and this was sent. Is she not seeing another neurologist now?

## 2019-03-18 NOTE — TELEPHONE ENCOUNTER
I'm not certain what needs to be done either [place]. I haven't refilled in 16 months, so will not refill now.

## 2019-03-20 NOTE — TELEPHONE ENCOUNTER
I spoke to this patient last week and she is aware that she will need to get her medication refilled from her new Neurologist since she has not been seen here since 2017.

## 2019-03-21 ENCOUNTER — TELEPHONE (OUTPATIENT)
Dept: NEUROLOGY | Age: 24
End: 2019-03-21

## 2019-03-21 NOTE — TELEPHONE ENCOUNTER
I informed patient that she will need to get her Amitriptyline from her new neurologist. She stated she is no longer taking this med.